# Patient Record
Sex: FEMALE | Race: BLACK OR AFRICAN AMERICAN | URBAN - METROPOLITAN AREA
[De-identification: names, ages, dates, MRNs, and addresses within clinical notes are randomized per-mention and may not be internally consistent; named-entity substitution may affect disease eponyms.]

---

## 2023-01-31 ENCOUNTER — WEB ENCOUNTER (OUTPATIENT)
Dept: URBAN - METROPOLITAN AREA CLINIC 25 | Facility: CLINIC | Age: 38
End: 2023-01-31

## 2023-02-03 ENCOUNTER — OFFICE VISIT (OUTPATIENT)
Dept: URBAN - METROPOLITAN AREA CLINIC 25 | Facility: CLINIC | Age: 38
End: 2023-02-03

## 2023-02-08 ENCOUNTER — WEB ENCOUNTER (OUTPATIENT)
Dept: URBAN - METROPOLITAN AREA CLINIC 92 | Facility: CLINIC | Age: 38
End: 2023-02-08

## 2023-02-08 ENCOUNTER — OFFICE VISIT (OUTPATIENT)
Dept: URBAN - METROPOLITAN AREA CLINIC 92 | Facility: CLINIC | Age: 38
End: 2023-02-08
Payer: COMMERCIAL

## 2023-02-08 VITALS
BODY MASS INDEX: 24.35 KG/M2 | WEIGHT: 124 LBS | HEART RATE: 74 BPM | TEMPERATURE: 96.8 F | DIASTOLIC BLOOD PRESSURE: 72 MMHG | SYSTOLIC BLOOD PRESSURE: 106 MMHG | HEIGHT: 60 IN

## 2023-02-08 DIAGNOSIS — Z3A.01 LESS THAN 8 WEEKS GESTATION OF PREGNANCY: ICD-10-CM

## 2023-02-08 DIAGNOSIS — K92.1 BLACK STOOL: ICD-10-CM

## 2023-02-08 LAB
ABSOLUTE BASOPHILS: 42
ABSOLUTE EOSINOPHILS: 63
ABSOLUTE LYMPHOCYTES: 2030
ABSOLUTE MONOCYTES: 448
ABSOLUTE NEUTROPHILS: 4417
BASOPHILS: 0.6
EOSINOPHILS: 0.9
HEMATOCRIT: 36.6
HEMOGLOBIN: 11.9
LYMPHOCYTES: 29
MCH: 30.7
MCHC: 32.5
MCV: 94.6
MONOCYTES: 6.4
MPV: 10.5
NEUTROPHILS: 63.1
PLATELET COUNT: 301
RDW: 13.1
RED BLOOD CELL COUNT: 3.87
WHITE BLOOD CELL COUNT: 7

## 2023-02-08 PROCEDURE — 99203 OFFICE O/P NEW LOW 30 MIN: CPT

## 2023-02-08 RX ORDER — OMEPRAZOLE 40 MG/1
1 CAPSULE 30 MINUTES BEFORE MORNING MEAL CAPSULE, DELAYED RELEASE ORAL TWICE A DAY
Qty: 180 | Refills: 0 | OUTPATIENT
Start: 2023-02-08

## 2023-02-08 NOTE — HPI-TODAY'S VISIT:
37-year-old female presents today with complaints of black stool.  She is currently in 9 weeks gestation with twins.  She notes that she did see OB last week, and they feel as though this pregnancy will not be viable.  She has a follow-up appointment with them today.  She did note she started having vaginal bleeding yesterday. This is her second pregnancy and her first ended in miscarriage years ago.   She notes she started having black stool a few weeks ago and went to [place] [facility].  There they did a fecal occult test which was positive.  Labs demonstrated HBG 11.9, MCV 92.2, HCT 34.8.  She states she is having about 1 bowel movement daily all of which are black.  She has not been on any iron or Pepto.  She does also have some associated lower abdominal cramping.  She did have episodes of constipation and diarrhea when this first started but her bowel movements are back to normal.  She denies any bright red blood per rectum, weight loss.  She denies any fevers, chills, nausea, vomiting, GERD symptoms, dysphagia, odynophagia, NSAID use.  Prior to this she drank alcohol socially.  Denies tobacco or illicit drug use.  She notes she is been feeling a little tired but nothing more than usual recently.  Denies any family history of any GI related cancers.

## 2023-03-08 ENCOUNTER — OFFICE VISIT (OUTPATIENT)
Dept: URBAN - METROPOLITAN AREA CLINIC 92 | Facility: CLINIC | Age: 38
End: 2023-03-08
Payer: COMMERCIAL

## 2023-03-08 ENCOUNTER — DASHBOARD ENCOUNTERS (OUTPATIENT)
Age: 38
End: 2023-03-08

## 2023-03-08 VITALS
TEMPERATURE: 96.4 F | SYSTOLIC BLOOD PRESSURE: 103 MMHG | DIASTOLIC BLOOD PRESSURE: 73 MMHG | BODY MASS INDEX: 24.35 KG/M2 | HEART RATE: 78 BPM | WEIGHT: 124 LBS | HEIGHT: 60 IN

## 2023-03-08 DIAGNOSIS — K92.1 BLACK STOOL: ICD-10-CM

## 2023-03-08 LAB
HEMATOCRIT: 35.6
HEMOGLOBIN: 12.1
MCH: 31.3
MCHC: 34
MCV: 92
MPV: 10.7
PLATELET COUNT: 295
RDW: 12.5
RED BLOOD CELL COUNT: 3.87
WHITE BLOOD CELL COUNT: 6.1

## 2023-03-08 PROCEDURE — 99213 OFFICE O/P EST LOW 20 MIN: CPT

## 2023-03-08 RX ORDER — OMEPRAZOLE 40 MG/1
1 CAPSULE 30 MINUTES BEFORE MORNING MEAL CAPSULE, DELAYED RELEASE ORAL TWICE A DAY
Qty: 180 | Refills: 0 | Status: ACTIVE | COMMUNITY
Start: 2023-02-08

## 2023-03-08 NOTE — PHYSICAL EXAM GASTROINTESTINAL
Abdomen,  soft, nontender, nondistended,  no guarding or rigidity,  no masses palpable,  normal bowel sounds Liver and Spleen,no hepatosplenomegaly Rectal Chaperone present No EH, IH or bleeding noted. Fecal Occult negative.

## 2023-03-08 NOTE — HPI-TODAY'S VISIT:
37-year-old female presents today with complaints of black stool.    2/8/23 She is currently in 9 weeks gestation with twins.  She notes that she did see OB last week, and they feel as though this pregnancy will not be viable.  She has a follow-up appointment with them today.  She did note she started having vaginal bleeding yesterday. This is her second pregnancy and her first ended in miscarriage years ago.   She notes she started having black stool a few weeks ago and went to [place] [facility].  There they did a fecal occult test which was positive.  Labs demonstrated HBG 11.9, MCV 92.2, HCT 34.8.  She states she is having about 1 bowel movement daily all of which are black.  She has not been on any iron or Pepto.  She does also have some associated lower abdominal cramping.  She did have episodes of constipation and diarrhea when this first started but her bowel movements are back to normal.  She denies any bright red blood per rectum, weight loss.  She denies any fevers, chills, nausea, vomiting, GERD symptoms, dysphagia, odynophagia, NSAID use.  Prior to this she drank alcohol socially.  Denies tobacco or illicit drug use.  She notes she is been feeling a little tired but nothing more than usual recently.  Denies any family history of any GI related cancers. Labs   3/8/23 After last visit obtained labs that showed 2/8/23: hgb 11.9, hct36.6, mcv 94.6.  She did have a D and C 3 weeks ago had a f/u with OBGYN this week and was told she has a cyst on her ovary they are watching. She is having normal BM now but still notes they are darker then usual. No iron or pepto use.  She is still on omeprazole BID.
